# Patient Record
(demographics unavailable — no encounter records)

---

## 2024-11-01 NOTE — HISTORY OF PRESENT ILLNESS
[FreeTextEntry1] : Follow-up and disease management. [de-identified] : Patient is a 61-year-old gentleman who presents today for follow-up and disease management medical history significant for adult attention deficit disorder, chronic pain syndrome secondary to chronic back pain patient is followed closely by orthopedics, generalized anxiety disorder, depression, panic disorder and multiple skin lesions diagnosed as skin CA followed closely by dermatology.  Status post cardiology workup status post echocardiogram, electrocardiogram, stress test all basically within normal limits except for mildly dilated aortic root..  Patient does have follow-up with cardiology this coming month.

## 2024-11-01 NOTE — ASSESSMENT
[FreeTextEntry1] : Assessment and plan:  1.  Chronic pain syndrome which is multifactorial patient does have central canal stenosis and cervical canal stenosis with chronic neck pain and chronic back pain.  Patient declines opioids therefore the pain is controlled by muscle relaxants cyclobenzaprine and ibuprofen.  Patient has been evaluated in the past by spine specialist the recommendations are conservative management without surgery at this time.  2.  Adult attention deficit disorder patient does have Adderall available does not take it on a regular basis more on an as-needed basis especially with his underlying panic disorder.  3.  Anxiety with panic attacks patient has both alprazolam for acute anxiety and Klonopin available patient's medications are prescribed by psychiatry which she has been seeing for many years so medications have been structured for patient he will also continue amitriptyline as prescribed.  4.  Multiple skin lesions status post dermatology evaluation patient has been diagnosed with both squamous cell carcinoma and basal cell carcinoma he needs constant and chronic skin surveillance at least every 6 months.  Patient was a  [email or web address] and has had multiple biopsies which were positive for squamous cell carcinoma and also for basal cell it appears that the patient has had 24 positive biopsies the recommendations are that the patient establish himself with the [email or web address] foundation to establish himself with their physicians and subsequently patient will be covered for biopsies etc.  5.  Fluzone influenza vaccine administered at this visit right deltoid.  Total time spent face-to-face and non-face-to-face time 30 minutes the majority of which is spent on counseling and coordination of care.

## 2024-11-01 NOTE — REVIEW OF SYSTEMS
[Fatigue] : fatigue [Joint Pain] : joint pain [Joint Stiffness] : joint stiffness [Muscle Pain] : muscle pain [Muscle Weakness] : muscle weakness [Back Pain] : back pain [Insomnia] : insomnia [Anxiety] : anxiety [Depression] : depression [Negative] : Heme/Lymph [Fever] : no fever [Chills] : no chills [Hot Flashes] : no hot flashes [Night Sweats] : no night sweats [Recent Change In Weight] : ~T no recent weight change [Chest Pain] : no chest pain [Palpitations] : no palpitations [Claudication] : no  leg claudication [Lower Ext Edema] : no lower extremity edema [Orthopena] : no orthopnea [Paroxysmal Nocturnal Dyspnea] : no paroxysmal nocturnal dyspnea [Joint Swelling] : no joint swelling [Suicidal] : not suicidal [de-identified] : recent panic attack.

## 2024-11-01 NOTE — PHYSICAL EXAM
[Well Nourished] : well nourished [Well Developed] : well developed [Well-Appearing] : well-appearing [Normal Voice/Communication] : normal voice/communication [Normal Sclera/Conjunctiva] : normal sclera/conjunctiva [PERRL] : pupils equal round and reactive to light [EOMI] : extraocular movements intact [Normal Outer Ear/Nose] : the outer ears and nose were normal in appearance [Normal Oropharynx] : the oropharynx was normal [Normal TMs] : both tympanic membranes were normal [Normal Nasal Mucosa] : the nasal mucosa was normal [No JVD] : no jugular venous distention [No Lymphadenopathy] : no lymphadenopathy [Supple] : supple [Thyroid Normal, No Nodules] : the thyroid was normal and there were no nodules present [No Respiratory Distress] : no respiratory distress  [No Accessory Muscle Use] : no accessory muscle use [Clear to Auscultation] : lungs were clear to auscultation bilaterally [Normal Rate] : normal rate  [Regular Rhythm] : with a regular rhythm [Normal S1, S2] : normal S1 and S2 [No Murmur] : no murmur heard [No Carotid Bruits] : no carotid bruits [No Abdominal Bruit] : a ~M bruit was not heard ~T in the abdomen [No Varicosities] : no varicosities [Pedal Pulses Present] : the pedal pulses are present [No Edema] : there was no peripheral edema [No Palpable Aorta] : no palpable aorta [No Extremity Clubbing/Cyanosis] : no extremity clubbing/cyanosis [Soft] : abdomen soft [Non Tender] : non-tender [Non-distended] : non-distended [No Masses] : no abdominal mass palpated [No HSM] : no HSM [Normal Bowel Sounds] : normal bowel sounds [No CVA Tenderness] : no CVA  tenderness [No Spinal Tenderness] : no spinal tenderness [No Joint Swelling] : no joint swelling [No Rash] : no rash [Coordination Grossly Intact] : coordination grossly intact [No Focal Deficits] : no focal deficits [Normal Gait] : normal gait [Deep Tendon Reflexes (DTR)] : deep tendon reflexes were 2+ and symmetric [Speech Grossly Normal] : speech grossly normal [Memory Grossly Normal] : memory grossly normal [Normal Affect] : the affect was normal [Alert and Oriented x3] : oriented to person, place, and time [Normal Insight/Judgement] : insight and judgment were intact [de-identified] : LS spasm [de-identified] : Right upper extremity weakness, pain worsens right shoulder with abduction against resistance.Improved with PT but needs MRI for full work up. [de-identified] : anxiety,panic

## 2024-11-01 NOTE — HEALTH RISK ASSESSMENT
[Yes] : Yes [2 - 3 times a week (3 pts)] : 2 - 3  times a week (3 points) [1 or 2 (0 pts)] : 1 or 2 (0 points) [Never (0 pts)] : Never (0 points) [No] : In the past 12 months have you used drugs other than those required for medical reasons? No [No falls in past year] : Patient reported no falls in the past year [I have developed a follow-up plan documented below in the note.] : I have developed a follow-up plan documented below in the note. [With Patient/Caregiver] : , with patient/caregiver [Reviewed no changes] : Reviewed, no changes [Designated Healthcare Proxy] : Designated healthcare proxy [Name: ___] : Health Care Proxy's Name: [unfilled]  [Relationship: ___] : Relationship: [unfilled] [Aggressive treatment] : aggressive treatment [I will adhere to the patient's wishes.] : I will adhere to the patient's wishes. [Never] : Never [Audit-CScore] : 3 [AdvancecareDate] : 11/24

## 2025-01-22 NOTE — HISTORY OF PRESENT ILLNESS
[FreeTextEntry1] : Follow-up and disease management.  Chief complaint increased size mass of soft tissue right shoulder it appears to be scar from previous surgery but now has increased in size causing discomfort. [de-identified] : Patient is a 61-year-old gentleman who presents today for follow-up and disease management medical history significant for adult attention deficit disorder, chronic pain syndrome secondary to chronic back pain patient is followed closely by orthopedics, generalized anxiety disorder, depression, panic disorder and multiple skin lesions diagnosed as skin CA followed closely by dermatology.  Status post cardiology workup status post echocardiogram, electrocardiogram, stress test all basically within normal limits except for mildly dilated aortic root..  Patient does have follow-up with cardiology this coming month.

## 2025-01-22 NOTE — REVIEW OF SYSTEMS
[Fever] : no fever [Chills] : no chills [Fatigue] : fatigue [Hot Flashes] : no hot flashes [Night Sweats] : no night sweats [Recent Change In Weight] : ~T no recent weight change [Chest Pain] : no chest pain [Palpitations] : no palpitations [Claudication] : no  leg claudication [Lower Ext Edema] : no lower extremity edema [Orthopena] : no orthopnea [Paroxysmal Nocturnal Dyspnea] : no paroxysmal nocturnal dyspnea [Joint Pain] : joint pain [Joint Stiffness] : joint stiffness [Muscle Pain] : muscle pain [Muscle Weakness] : muscle weakness [Back Pain] : back pain [Joint Swelling] : no joint swelling [Suicidal] : not suicidal [Insomnia] : insomnia [Anxiety] : anxiety [Depression] : depression [Negative] : Heme/Lymph [de-identified] : recent panic attack.

## 2025-01-22 NOTE — ASSESSMENT
[FreeTextEntry1] : Assessment and plan:  1.  Chronic pain syndrome which is multifactorial patient does have central canal stenosis and cervical canal stenosis with chronic neck pain and chronic back pain.  Patient declines opioids therefore the pain is controlled by muscle relaxants cyclobenzaprine and ibuprofen.  Patient has been evaluated in the past by spine specialist the recommendations are conservative management without surgery at this time.  2.  Adult attention deficit disorder patient does have Adderall available does not take it on a regular basis more on an as-needed basis especially with his underlying panic disorder.  3.  Anxiety with panic attacks patient has both alprazolam for acute anxiety and Klonopin available patient's medications are prescribed by psychiatry which she has been seeing for many years so medications have been structured for patient he will also continue amitriptyline as prescribed.  4.  Multiple skin lesions status post dermatology evaluation patient has been diagnosed with both squamous cell carcinoma and basal cell carcinoma he needs constant and chronic skin surveillance at least every 6 months.  Patient was a  [employer] and has had multiple biopsies which were positive for squamous cell carcinoma and also for basal cell it appears that the patient has had 24 positive biopsies the recommendations are that the patient establish himself with the [employer] foundation to establish himself with their physicians and subsequently patient will be covered for biopsies etc.  5.  Soft tissue mass right shoulder area it has become bothersome therefore referral to general surgery given.  Total time spent face-to-face and non-face-to-face time 35 minutes the majority of which is spent on counseling and coordination of care.

## 2025-01-22 NOTE — HEALTH RISK ASSESSMENT
[Yes] : Yes [2 - 3 times a week (3 pts)] : 2 - 3  times a week (3 points) [1 or 2 (0 pts)] : 1 or 2 (0 points) [Never (0 pts)] : Never (0 points) [No] : In the past 12 months have you used drugs other than those required for medical reasons? No [No falls in past year] : Patient reported no falls in the past year [Little interest or pleasure doing things] : 1) Little interest or pleasure doing things [Feeling down, depressed, or hopeless] : 2) Feeling down, depressed, or hopeless [0] : 2) Feeling down, depressed, or hopeless: Not at all (0) [PHQ-2 Negative - No further assessment needed] : PHQ-2 Negative - No further assessment needed [I have developed a follow-up plan documented below in the note.] : I have developed a follow-up plan documented below in the note. [Audit-CScore] : 3 [[id number]] : 0 [Never] : Never

## 2025-01-22 NOTE — PHYSICAL EXAM
[Well Nourished] : well nourished [Well Developed] : well developed [Well-Appearing] : well-appearing [Normal Voice/Communication] : normal voice/communication [Normal Sclera/Conjunctiva] : normal sclera/conjunctiva [PERRL] : pupils equal round and reactive to light [EOMI] : extraocular movements intact [Normal Outer Ear/Nose] : the outer ears and nose were normal in appearance [Normal Oropharynx] : the oropharynx was normal [Normal TMs] : both tympanic membranes were normal [Normal Nasal Mucosa] : the nasal mucosa was normal [No JVD] : no jugular venous distention [No Lymphadenopathy] : no lymphadenopathy [Supple] : supple [Thyroid Normal, No Nodules] : the thyroid was normal and there were no nodules present [No Respiratory Distress] : no respiratory distress  [No Accessory Muscle Use] : no accessory muscle use [Clear to Auscultation] : lungs were clear to auscultation bilaterally [Normal Rate] : normal rate  [Regular Rhythm] : with a regular rhythm [Normal S1, S2] : normal S1 and S2 [No Murmur] : no murmur heard [No Carotid Bruits] : no carotid bruits [No Abdominal Bruit] : a ~M bruit was not heard ~T in the abdomen [No Varicosities] : no varicosities [Pedal Pulses Present] : the pedal pulses are present [No Edema] : there was no peripheral edema [No Palpable Aorta] : no palpable aorta [No Extremity Clubbing/Cyanosis] : no extremity clubbing/cyanosis [Soft] : abdomen soft [Non Tender] : non-tender [Non-distended] : non-distended [No Masses] : no abdominal mass palpated [No HSM] : no HSM [Normal Bowel Sounds] : normal bowel sounds [No CVA Tenderness] : no CVA  tenderness [No Spinal Tenderness] : no spinal tenderness [No Joint Swelling] : no joint swelling [No Rash] : no rash [Coordination Grossly Intact] : coordination grossly intact [No Focal Deficits] : no focal deficits [Normal Gait] : normal gait [Deep Tendon Reflexes (DTR)] : deep tendon reflexes were 2+ and symmetric [Speech Grossly Normal] : speech grossly normal [Memory Grossly Normal] : memory grossly normal [Normal Affect] : the affect was normal [Alert and Oriented x3] : oriented to person, place, and time [Normal Insight/Judgement] : insight and judgment were intact [de-identified] : LS spasm [de-identified] : Right upper extremity weakness, pain worsens right shoulder with abduction against resistance.Improved with PT but needs MRI for full work up. [de-identified] : anxiety,panic

## 2025-02-01 NOTE — PHYSICAL EXAM
[Normal Breath Sounds] : Normal breath sounds [Normal Heart Sounds] : normal heart sounds [Normal Rate and Rhythm] : normal rate and rhythm [Abdominal Masses] : No abdominal masses [Abdomen Tenderness] : ~T ~M No abdominal tenderness [de-identified] : nl [de-identified] : nl [de-identified] : nl [de-identified] : skin overlying right shoulder - 2.1 cm. cutaneous mass

## 2025-02-01 NOTE — HISTORY OF PRESENT ILLNESS
[de-identified] : This 61 year old man has been aware of a mass in the skin of the right shoulder area for the past year. There mass is not painful, and there has not been any drainage from the zakiya. The patient has a history of multiple skin cancers.

## 2025-02-01 NOTE — ASSESSMENT
[FreeTextEntry1] : Discussion regarding all options and risks To return for excision of the mass.  All lab values and imaging studies reviewed Discussed with Medicine

## 2025-02-10 NOTE — PROCEDURE
[FreeTextEntry1] : 1.0 % xylocaine  Wide excision 2.1 cm. mass of right upper back 5, #5-0 nylon sutures

## 2025-04-23 NOTE — PHYSICAL EXAM
[Well Nourished] : well nourished [Well Developed] : well developed [Well-Appearing] : well-appearing [Normal Voice/Communication] : normal voice/communication [Normal Sclera/Conjunctiva] : normal sclera/conjunctiva [PERRL] : pupils equal round and reactive to light [EOMI] : extraocular movements intact [Normal Outer Ear/Nose] : the outer ears and nose were normal in appearance [Normal Oropharynx] : the oropharynx was normal [Normal TMs] : both tympanic membranes were normal [Normal Nasal Mucosa] : the nasal mucosa was normal [No JVD] : no jugular venous distention [No Lymphadenopathy] : no lymphadenopathy [Supple] : supple [Thyroid Normal, No Nodules] : the thyroid was normal and there were no nodules present [No Respiratory Distress] : no respiratory distress  [No Accessory Muscle Use] : no accessory muscle use [Clear to Auscultation] : lungs were clear to auscultation bilaterally [Normal Rate] : normal rate  [Regular Rhythm] : with a regular rhythm [Normal S1, S2] : normal S1 and S2 [No Murmur] : no murmur heard [No Carotid Bruits] : no carotid bruits [No Abdominal Bruit] : a ~M bruit was not heard ~T in the abdomen [No Varicosities] : no varicosities [Pedal Pulses Present] : the pedal pulses are present [No Edema] : there was no peripheral edema [No Palpable Aorta] : no palpable aorta [No Extremity Clubbing/Cyanosis] : no extremity clubbing/cyanosis [Soft] : abdomen soft [Non Tender] : non-tender [Non-distended] : non-distended [No Masses] : no abdominal mass palpated [No HSM] : no HSM [Normal Bowel Sounds] : normal bowel sounds [No CVA Tenderness] : no CVA  tenderness [No Spinal Tenderness] : no spinal tenderness [No Joint Swelling] : no joint swelling [No Rash] : no rash [Coordination Grossly Intact] : coordination grossly intact [No Focal Deficits] : no focal deficits [Normal Gait] : normal gait [Deep Tendon Reflexes (DTR)] : deep tendon reflexes were 2+ and symmetric [Speech Grossly Normal] : speech grossly normal [Memory Grossly Normal] : memory grossly normal [Normal Affect] : the affect was normal [Alert and Oriented x3] : oriented to person, place, and time [Normal Insight/Judgement] : insight and judgment were intact [de-identified] : LS spasm [de-identified] : Right upper extremity weakness, pain worsens right shoulder with abduction against resistance.Improved with PT but needs MRI for full work up. [de-identified] : Status post Mohs, squamous cell carcinoma, healing well. [de-identified] : anxiety,panic

## 2025-04-23 NOTE — REVIEW OF SYSTEMS
[Fatigue] : fatigue [Joint Pain] : joint pain [Joint Stiffness] : joint stiffness [Muscle Pain] : muscle pain [Muscle Weakness] : muscle weakness [Back Pain] : back pain [Insomnia] : insomnia [Anxiety] : anxiety [Depression] : depression [Negative] : Heme/Lymph [Fever] : no fever [Chills] : no chills [Hot Flashes] : no hot flashes [Night Sweats] : no night sweats [Recent Change In Weight] : ~T no recent weight change [Chest Pain] : no chest pain [Palpitations] : no palpitations [Claudication] : no  leg claudication [Lower Ext Edema] : no lower extremity edema [Orthopena] : no orthopnea [Paroxysmal Nocturnal Dyspnea] : no paroxysmal nocturnal dyspnea [Joint Swelling] : no joint swelling [Suicidal] : not suicidal [de-identified] : Status post Mohs procedure left side of face residual paresthesia [de-identified] : recent panic attack.

## 2025-04-23 NOTE — ASSESSMENT
[FreeTextEntry1] : Assessment and plan:  1.  Chronic pain syndrome which is multifactorial patient does have central canal stenosis and cervical canal stenosis with chronic neck pain and chronic back pain.  Patient declines opioids therefore the pain is controlled by muscle relaxants cyclobenzaprine and ibuprofen.  Patient has been evaluated in the past by spine specialist the recommendations are conservative management without surgery at this time.  2.  Adult attention deficit disorder patient does have Adderall available does not take it on a regular basis more on an as-needed basis especially with his underlying panic disorder.  3.  Anxiety with panic attacks patient has both alprazolam for acute anxiety and Klonopin available patient's medications are prescribed by psychiatry which she has been seeing for many years so medications have been structured for patient he will also continue amitriptyline as prescribed.  4.  Multiple skin lesions status post dermatology evaluation patient has been diagnosed with both squamous cell carcinoma and basal cell carcinoma he needs constant and chronic skin surveillance at least every 6 months.  Patient was a  [employer] and has had multiple biopsies which were positive for squamous cell carcinoma and also for basal cell it appears that the patient has had 24 positive biopsies the recommendations are that the patient establish himself with the [employer] to establish himself with their physicians and subsequently patient will be covered for biopsies etc.  Most recently patient had Mohs procedure performed left side of face due to lesion which was diagnosed as a squamous cell carcinoma.  Stitches have been removed Steri-Strips are still in place slowly healing.  5.  Soft tissue mass right shoulder status post excision by Dr. Infante diagnosis keloid status post removal of a squamous cell carcinoma.  Total time spent face-to-face and non-face-to-face time 35 minutes the majority of which is spent on counseling and coordination of care.

## 2025-04-23 NOTE — HISTORY OF PRESENT ILLNESS
[FreeTextEntry1] : Follow-up disease management status post resection of shoulder mass  that is of right shoulder patient also underwent Mohs procedure 2 weeks ago diagnosis squamous cell carcinoma left side of face. [de-identified] : Patient is a 61-year-old gentleman who presents today for follow-up and disease management medical history significant for adult attention deficit disorder, chronic pain syndrome secondary to chronic back pain patient is followed closely by orthopedics, generalized anxiety disorder, depression, panic disorder and multiple skin lesions diagnosed as skin CA followed closely by dermatology.  Status post cardiology workup status post echocardiogram, electrocardiogram, stress test all basically within normal limits except for mildly dilated aortic root..

## 2025-07-23 NOTE — HEALTH RISK ASSESSMENT
[Yes] : Yes [2 - 3 times a week (3 pts)] : 2 - 3  times a week (3 points) [1 or 2 (0 pts)] : 1 or 2 (0 points) [Never (0 pts)] : Never (0 points) [No] : In the past 12 months have you used drugs other than those required for medical reasons? No [No falls in past year] : Patient reported no falls in the past year [Little interest or pleasure doing things] : 1) Little interest or pleasure doing things [1] : 1) Little interest or pleasure doing things for several days (1) [Feeling down, depressed, or hopeless] : 2) Feeling down, depressed, or hopeless [0] : 2) Feeling down, depressed, or hopeless: Not at all (0) [PHQ-2 Negative - No further assessment needed] : PHQ-2 Negative - No further assessment needed [Audit-CScore] : 3 [[id number]] : 1 [Never] : Never

## 2025-07-23 NOTE — HISTORY OF PRESENT ILLNESS
[FreeTextEntry1] : Follow-up with disease management. [de-identified] : Patient is a 62-year-old gentleman who presents today for follow-up and disease management medical history significant for adult attention deficit disorder, chronic pain syndrome secondary to chronic back pain patient is followed closely by orthopedics, generalized anxiety disorder, depression, panic disorder and multiple skin lesions diagnosed as skin CA followed closely by dermatology.  Status post cardiology workup status post echocardiogram, electrocardiogram, stress test all basically within normal limits except for mildly dilated aortic root..

## 2025-07-23 NOTE — REVIEW OF SYSTEMS
[Fever] : no fever [Chills] : no chills [Fatigue] : fatigue [Hot Flashes] : no hot flashes [Night Sweats] : no night sweats [Recent Change In Weight] : ~T no recent weight change [Chest Pain] : no chest pain [Palpitations] : no palpitations [Claudication] : no  leg claudication [Lower Ext Edema] : no lower extremity edema [Orthopena] : no orthopnea [Paroxysmal Nocturnal Dyspnea] : no paroxysmal nocturnal dyspnea [Joint Pain] : joint pain [Joint Stiffness] : joint stiffness [Muscle Pain] : muscle pain [Muscle Weakness] : muscle weakness [Back Pain] : back pain [Joint Swelling] : no joint swelling [Suicidal] : not suicidal [Insomnia] : insomnia [Anxiety] : anxiety [Depression] : depression [Negative] : Heme/Lymph [de-identified] : Status post Mohs procedure left side of face residual paresthesia [de-identified] : recent panic attack.

## 2025-07-23 NOTE — ASSESSMENT
[FreeTextEntry1] : Assessment and plan:  1.  Chronic pain syndrome which is multifactorial patient does have central canal stenosis and cervical canal stenosis with chronic neck pain and chronic back pain.  Patient declines opioids therefore the pain is controlled by muscle relaxants cyclobenzaprine and ibuprofen.  Patient has been evaluated in the past by spine specialist the recommendations are conservative management without surgery at this time.  2.  Adult attention deficit disorder patient does have Adderall available does not take it on a regular basis more on an as-needed basis especially with his underlying panic disorder.  3.  Anxiety with panic attacks patient has both alprazolam for acute anxiety and Klonopin available patient's medications are prescribed by psychiatry which she has been seeing for many years so medications have been structured for patient he will also continue amitriptyline as prescribed.  4.  Multiple skin lesions status post dermatology evaluation patient has been diagnosed with both squamous cell carcinoma and basal cell carcinoma he needs constant and chronic skin surveillance at least every 6 months.  Patient was a  [employer] and has had multiple biopsies which were positive for squamous cell carcinoma and also for basal cell it appears that the patient has had 24 positive biopsies the recommendations are that the patient establish himself with the [employer] to establish himself with their physicians and subsequently patient will be covered for biopsies etc.  Most recently patient had Mohs procedure performed left side of face due to lesion which was diagnosed as a squamous cell carcinoma.  Stitches have been removed Steri-Strips are still in place slowly healing.  5.  Soft tissue mass right shoulder status post excision by Dr. Infante diagnosis keloid status post removal of a squamous cell carcinoma.  Total time spent face-to-face and non-face-to-face time 30 minutes the majority of which is spent on counseling and coordination of care.

## 2025-07-23 NOTE — PHYSICAL EXAM
[Well Nourished] : well nourished [Well Developed] : well developed [Well-Appearing] : well-appearing [Normal Voice/Communication] : normal voice/communication [Normal Sclera/Conjunctiva] : normal sclera/conjunctiva [PERRL] : pupils equal round and reactive to light [EOMI] : extraocular movements intact [Normal Outer Ear/Nose] : the outer ears and nose were normal in appearance [Normal Oropharynx] : the oropharynx was normal [Normal TMs] : both tympanic membranes were normal [Normal Nasal Mucosa] : the nasal mucosa was normal [No JVD] : no jugular venous distention [No Lymphadenopathy] : no lymphadenopathy [Supple] : supple [Thyroid Normal, No Nodules] : the thyroid was normal and there were no nodules present [No Respiratory Distress] : no respiratory distress  [No Accessory Muscle Use] : no accessory muscle use [Clear to Auscultation] : lungs were clear to auscultation bilaterally [Normal Rate] : normal rate  [Regular Rhythm] : with a regular rhythm [Normal S1, S2] : normal S1 and S2 [No Murmur] : no murmur heard [No Carotid Bruits] : no carotid bruits [No Abdominal Bruit] : a ~M bruit was not heard ~T in the abdomen [No Varicosities] : no varicosities [Pedal Pulses Present] : the pedal pulses are present [No Edema] : there was no peripheral edema [No Palpable Aorta] : no palpable aorta [No Extremity Clubbing/Cyanosis] : no extremity clubbing/cyanosis [Soft] : abdomen soft [Non Tender] : non-tender [Non-distended] : non-distended [No Masses] : no abdominal mass palpated [No HSM] : no HSM [Normal Bowel Sounds] : normal bowel sounds [No CVA Tenderness] : no CVA  tenderness [No Spinal Tenderness] : no spinal tenderness [No Joint Swelling] : no joint swelling [No Rash] : no rash [Coordination Grossly Intact] : coordination grossly intact [No Focal Deficits] : no focal deficits [Normal Gait] : normal gait [Deep Tendon Reflexes (DTR)] : deep tendon reflexes were 2+ and symmetric [Speech Grossly Normal] : speech grossly normal [Memory Grossly Normal] : memory grossly normal [Normal Affect] : the affect was normal [Alert and Oriented x3] : oriented to person, place, and time [Normal Insight/Judgement] : insight and judgment were intact [de-identified] : LS spasm [de-identified] : Right upper extremity weakness, pain worsens right shoulder with abduction against resistance.Improved with PT but needs MRI for full work up. [de-identified] : Status post Mohs, squamous cell carcinoma, healing well. [de-identified] : anxiety,panic